# Patient Record
Sex: MALE | Race: WHITE | NOT HISPANIC OR LATINO | URBAN - METROPOLITAN AREA
[De-identification: names, ages, dates, MRNs, and addresses within clinical notes are randomized per-mention and may not be internally consistent; named-entity substitution may affect disease eponyms.]

---

## 2017-01-24 ENCOUNTER — IMPORTED ENCOUNTER (OUTPATIENT)
Dept: URBAN - METROPOLITAN AREA CLINIC 1 | Facility: CLINIC | Age: 72
End: 2017-01-24

## 2017-01-24 PROBLEM — H02.834: Noted: 2017-01-24

## 2017-01-24 PROBLEM — H02.831: Noted: 2017-01-24

## 2017-01-24 PROBLEM — H25.813: Noted: 2017-01-24

## 2017-01-24 PROBLEM — H35.033: Noted: 2017-01-24

## 2017-01-24 PROBLEM — H04.123: Noted: 2017-01-24

## 2017-01-24 PROBLEM — H16.143: Noted: 2017-01-24

## 2017-01-24 PROCEDURE — 92012 INTRM OPH EXAM EST PATIENT: CPT

## 2017-01-24 NOTE — PATIENT DISCUSSION
1.  Cataract OU: Progressive: Discussed options of Phaco vs. Observation. Pt wishes to hold on sx at this time. 2.  UZAIR w/ PEK OU- Continue ATs TID OU routinely. 3.  Dermatochalasis OU UL's  - Observe 4. Hypertensive Retinopathy OU- Stable continue HTN Control5. H/o DM w/o DR RENDON Return for an appointment in June/ July 30 glare (Schedule Phaco) with Dr. Rossana Lee.

## 2017-06-20 ENCOUNTER — IMPORTED ENCOUNTER (OUTPATIENT)
Dept: URBAN - METROPOLITAN AREA CLINIC 1 | Facility: CLINIC | Age: 72
End: 2017-06-20

## 2017-06-20 PROBLEM — H25.813: Noted: 2017-06-20

## 2017-06-20 PROBLEM — E11.9: Noted: 2017-06-20

## 2017-06-20 PROBLEM — H16.143: Noted: 2017-06-20

## 2017-06-20 PROBLEM — Z79.84: Noted: 2017-06-20

## 2017-06-20 PROBLEM — H04.123: Noted: 2017-06-20

## 2017-06-20 PROBLEM — H35.033: Noted: 2017-06-20

## 2017-06-20 PROCEDURE — 92014 COMPRE OPH EXAM EST PT 1/>: CPT

## 2017-06-20 PROCEDURE — 92015 DETERMINE REFRACTIVE STATE: CPT

## 2017-06-20 NOTE — PATIENT DISCUSSION
1.  DM Type II (Oral Medication) without sign of diabetic retinopathy and no blot heme on dilated retinal examination today OU No Macular Edema:  Discussed the pathophysiology of diabetes and its effect on the eye and risk of blindness. Stressed the importance of strong glucose control. Advised of importance of at least yearly dilated examinations but to contact us immediately for any problems or concerns. 2. Cataract OU:  Visually Significant secondary to glare discussed the risks benefits alternatives and limitations of cataract surgery. The patient stated a full understanding and a desire to proceed with the procedure. The patient will need to return for preop appointment with cataract measurements and to have any additional questions answered and start pre-operative eye drops as directed. Phaco PCL OS then OD Otherwise follow-up3. UZAIR w/ PEK OU-The use/continuation of artificial tears were recommended. 4.  GR I Hypertensive Retinopathy OU- Stable continue HTN ControlReturn for an appointment for [place] and [place] with Dr. Andrew Bernal.

## 2017-07-28 ENCOUNTER — IMPORTED ENCOUNTER (OUTPATIENT)
Dept: URBAN - METROPOLITAN AREA CLINIC 1 | Facility: CLINIC | Age: 72
End: 2017-07-28

## 2017-07-28 PROBLEM — H25.813: Noted: 2017-07-28

## 2017-07-28 PROCEDURE — 92136 OPHTHALMIC BIOMETRY: CPT

## 2017-07-28 NOTE — PATIENT DISCUSSION
1. Cataract OU:  Visually Significant secondary to glare discussed the risks benefits alternatives and limitations of cataract surgery. The patient stated a full understanding and a desire to proceed with the procedure. Pt understands they will need glasses post-op to achieve their best corrected vision. Pt wants what insurance covers. Phaco PCL OS then OD. Standard lens. Confirmed PO Meds were sent to Pharmacy.

## 2017-08-09 ENCOUNTER — IMPORTED ENCOUNTER (OUTPATIENT)
Dept: URBAN - METROPOLITAN AREA CLINIC 1 | Facility: CLINIC | Age: 72
End: 2017-08-09

## 2017-08-10 ENCOUNTER — IMPORTED ENCOUNTER (OUTPATIENT)
Dept: URBAN - METROPOLITAN AREA CLINIC 1 | Facility: CLINIC | Age: 72
End: 2017-08-10

## 2017-08-10 PROBLEM — Z96.1: Noted: 2017-08-10

## 2017-08-10 PROCEDURE — 99024 POSTOP FOLLOW-UP VISIT: CPT

## 2017-08-10 NOTE — PATIENT DISCUSSION
POD #1 CE/IOL OS (Standard) - doing well Continue Durezol BID OS Continue Ocuflox TID OS Continue Ilevro Qdaily OS

## 2017-08-15 ENCOUNTER — IMPORTED ENCOUNTER (OUTPATIENT)
Dept: URBAN - METROPOLITAN AREA CLINIC 1 | Facility: CLINIC | Age: 72
End: 2017-08-15

## 2017-08-15 PROBLEM — Z96.1: Noted: 2017-08-15

## 2017-08-15 PROBLEM — H25.811: Noted: 2017-08-15

## 2017-08-15 PROCEDURE — 92136 OPHTHALMIC BIOMETRY: CPT

## 2017-08-15 NOTE — PATIENT DISCUSSION
1.  Cataract OD: Visually Significant secondary to glare discussed the risks benefits alternatives and limitations of cataract surgery. The patient stated a full understanding and a desire to proceed with the procedure. The patient will need to start pre-operative eye drops as directed. Proceed w/ phaco PCL ODPt understands they will need glasses post-op to achieve their best corrected vision. 2.  POW#1  CE/IOL Standard OS doing well. Discontinue OcufloxContinue Lotemax/Durezol/Prednisolone BID until gone. Continue Prolensa/Ilevro/Acular QD until gone. 3. RTC as scheduled for sx OD w/ PMG.

## 2017-08-23 ENCOUNTER — IMPORTED ENCOUNTER (OUTPATIENT)
Dept: URBAN - METROPOLITAN AREA CLINIC 1 | Facility: CLINIC | Age: 72
End: 2017-08-23

## 2017-08-24 ENCOUNTER — IMPORTED ENCOUNTER (OUTPATIENT)
Dept: URBAN - METROPOLITAN AREA CLINIC 1 | Facility: CLINIC | Age: 72
End: 2017-08-24

## 2017-08-24 PROBLEM — Z96.1: Noted: 2017-08-24

## 2017-08-24 PROCEDURE — 99024 POSTOP FOLLOW-UP VISIT: CPT

## 2017-08-24 NOTE — PATIENT DISCUSSION
1. POD#1 CE/IOL Standard OD doing well. Continue all 3 gtts as prescribed and until gone. Ocuflox TIDDurezol BIDIlevro QDPost op Warnings Reiterated 2. POW#2  CE/IOL Standard OS doing well. Continue Durezol BID until gone. Continue Ilevro QD until gone. 3.   RTC as scheduled

## 2017-09-15 ENCOUNTER — IMPORTED ENCOUNTER (OUTPATIENT)
Dept: URBAN - METROPOLITAN AREA CLINIC 1 | Facility: CLINIC | Age: 72
End: 2017-09-15

## 2017-09-15 PROBLEM — Z96.1: Noted: 2017-09-15

## 2017-09-15 PROCEDURE — 99024 POSTOP FOLLOW-UP VISIT: CPT

## 2017-09-15 NOTE — PATIENT DISCUSSION
POW#3 Phaco/ PCL OU (Standard OU) doing well Finish PO meds per schedule MRX for glasses given Return for an appointment in May/ [place] 30 with Dr. Ronnie Marquez.

## 2018-06-14 ENCOUNTER — IMPORTED ENCOUNTER (OUTPATIENT)
Dept: URBAN - METROPOLITAN AREA CLINIC 1 | Facility: CLINIC | Age: 73
End: 2018-06-14

## 2018-06-14 PROBLEM — Z96.1: Noted: 2018-06-14

## 2018-06-14 PROBLEM — H16.143: Noted: 2018-06-14

## 2018-06-14 PROBLEM — H04.123: Noted: 2018-06-14

## 2018-06-14 PROBLEM — H02.834: Noted: 2018-06-14

## 2018-06-14 PROBLEM — H35.61: Noted: 2018-06-14

## 2018-06-14 PROBLEM — E11.9: Noted: 2018-06-14

## 2018-06-14 PROBLEM — Z79.84: Noted: 2018-06-14

## 2018-06-14 PROBLEM — H02.831: Noted: 2018-06-14

## 2018-06-14 PROBLEM — H35.033: Noted: 2018-06-14

## 2018-06-14 PROCEDURE — 92014 COMPRE OPH EXAM EST PT 1/>: CPT

## 2018-06-14 PROCEDURE — 92250 FUNDUS PHOTOGRAPHY W/I&R: CPT

## 2018-06-14 NOTE — PATIENT DISCUSSION
1.  DM Type II (Oral Meds) -- Without sign of diabetic retinopathy and no blot heme on dilated retinal examination today OU No Macular Edema:  Discussed the pathophysiology of diabetes and its effect on the eye and risk of blindness. Stressed the importance of strong glucose control. Advised of importance of at least yearly dilated examinations but to contact us immediately for any problems or concerns. 2.  GR I Hypertensive Retinopathy OU -- Continue HTN Control but with likely secondary retinal heme. Recommend f/u with PCP to gain control of BP3. Retinal Heme OD -- Likely secondary to HTN. Will obtain baseline MAC Photo today2. UZAIR w/ PEK OU-The use/continuation of artificial tears were recommended. 3.  Pseudophakia OU  4. Dermatochalasis OU UL's -- Follow with no intervention at this time. Patient defers the refraction at today's visit    Return for an appointment in 6 MO for a 10 / DFE OU with Dr. Nicola Cervantes.

## 2018-12-14 ENCOUNTER — IMPORTED ENCOUNTER (OUTPATIENT)
Dept: URBAN - METROPOLITAN AREA CLINIC 1 | Facility: CLINIC | Age: 73
End: 2018-12-14

## 2018-12-14 PROBLEM — H35.61: Noted: 2018-12-14

## 2018-12-14 PROBLEM — Z79.84: Noted: 2018-12-14

## 2018-12-14 PROBLEM — H16.143: Noted: 2018-12-14

## 2018-12-14 PROBLEM — H35.033: Noted: 2018-12-14

## 2018-12-14 PROBLEM — Z96.1: Noted: 2018-12-14

## 2018-12-14 PROBLEM — H02.834: Noted: 2018-12-14

## 2018-12-14 PROBLEM — H02.831: Noted: 2018-12-14

## 2018-12-14 PROBLEM — H04.123: Noted: 2018-12-14

## 2018-12-14 PROBLEM — E11.9: Noted: 2018-12-14

## 2018-12-14 PROCEDURE — 99213 OFFICE O/P EST LOW 20 MIN: CPT

## 2018-12-14 NOTE — PATIENT DISCUSSION
1. Retinal Heme OD- Resolved. Condition discussed w/ pt. 2.  DM Type II (Oral Meds) -- Without sign of diabetic retinopathy and no blot heme on dilated retinal examination today OU No Macular Edema: Stable. 3.  GR I Hypertensive Retinopathy OU- Continue HTN Control but with likely secondary retinal heme. Recommend f/u with PCP to gain control of BP4. UZAIR w/ PEK OU- Stable. The continuation of artificial tears were recommended. 5.  Pseudophakia OU  6. Dermatochalasis OU UL's -- Follow with no intervention at this time. 7. Return for an appointment for 27 in 1 year with Dr. Jayla Townsend.

## 2019-12-10 ENCOUNTER — IMPORTED ENCOUNTER (OUTPATIENT)
Dept: URBAN - METROPOLITAN AREA CLINIC 1 | Facility: CLINIC | Age: 74
End: 2019-12-10

## 2019-12-10 PROBLEM — H16.143: Noted: 2019-12-10

## 2019-12-10 PROBLEM — Z79.4: Noted: 2019-12-10

## 2019-12-10 PROBLEM — H02.831: Noted: 2019-12-10

## 2019-12-10 PROBLEM — H35.033: Noted: 2019-12-10

## 2019-12-10 PROBLEM — Z96.1: Noted: 2019-12-10

## 2019-12-10 PROBLEM — H04.123: Noted: 2019-12-10

## 2019-12-10 PROBLEM — H02.834: Noted: 2019-12-10

## 2019-12-10 PROBLEM — E11.9: Noted: 2019-12-10

## 2019-12-10 PROCEDURE — 92014 COMPRE OPH EXAM EST PT 1/>: CPT

## 2019-12-10 NOTE — PATIENT DISCUSSION
1.  DM Type II (Insulin) without sign of diabetic retinopathy and no blot heme on dilated retinal examination today OU No Macular Edema:  Discussed the pathophysiology of diabetes and its effect on the eye and risk of blindness. Stressed the importance of strong glucose control. Advised of importance of at least yearly dilated examinations but to contact us immediately for any problems or concerns. 2. UZAIR w/ PEK OU- Recommend ATs TID OU routinely 3. Pseudophakia OU - (Standard OU) 4. GR I Hypertensive Retinopathy OU- Stable continue HTN Control5. Dermatochalasis OU UL's  - Follow with no intervention at this time. Patient deferred Manifest Rx today. Return for an appointment in 1 year 27 with Dr. Edel Connolly.

## 2020-12-10 ENCOUNTER — IMPORTED ENCOUNTER (OUTPATIENT)
Dept: URBAN - METROPOLITAN AREA CLINIC 1 | Facility: CLINIC | Age: 75
End: 2020-12-10

## 2020-12-10 PROBLEM — H02.831: Noted: 2020-12-10

## 2020-12-10 PROBLEM — E11.9: Noted: 2020-12-10

## 2020-12-10 PROBLEM — Z79.4: Noted: 2020-12-10

## 2020-12-10 PROBLEM — Z96.1: Noted: 2020-12-10

## 2020-12-10 PROBLEM — H04.123: Noted: 2020-12-10

## 2020-12-10 PROBLEM — H16.143: Noted: 2020-12-10

## 2020-12-10 PROBLEM — H35.033: Noted: 2020-12-10

## 2020-12-10 PROBLEM — H02.834: Noted: 2020-12-10

## 2020-12-10 PROCEDURE — 92014 COMPRE OPH EXAM EST PT 1/>: CPT

## 2020-12-10 NOTE — PATIENT DISCUSSION
1.  DM Type II (Insulin) without sign of diabetic retinopathy and no blot heme on dilated retinal examination today OU No Macular Edema:  Discussed the pathophysiology of diabetes and its effect on the eye and risk of blindness. Stressed the importance of strong glucose control. Advised of importance of at least yearly dilated examinations but to contact us immediately for any problems or concerns. 2. UZAIR w/ PEK OU- Recommend ATs TID OU routinely 3. Pseudophakia OU - (Standard OU) 4. GR I Hypertensive Retinopathy OU- Stable continue HTN Control5. Dermatochalasis OU UL's  - Follow with no intervention at this time. Patient deferred Manifest Rx today. Return for an appointment in 1 year 27 with Dr. Angela Simeon.

## 2022-04-08 ASSESSMENT — VISUAL ACUITY
OS_SC: 20/20
OD_CC: 20/20
OD_GLARE: 20/400
OS_SC: 20/20
OS_CC: 20/20
OD_CC: 20/20-1
OS_CC: 20/30
OD_CC: 20/20
OS_GLARE: 20/60
OS_CC: 20/30-1
OD_CC: 20/20
OD_GLARE: 20/60
OS_CC: 20/20
OS_CC: 20/20-1
OS_GLARE: 20/100
OS_CC: 20/20
OD_SC: 20/30
OS_CC: 20/20
OD_CC: 20/20
OS_CC: 20/20
OD_CC: 20/20
OD_SC: 20/40

## 2022-04-08 ASSESSMENT — TONOMETRY
OD_IOP_MMHG: 17
OS_IOP_MMHG: 15
OS_IOP_MMHG: 13
OS_IOP_MMHG: 16
OS_IOP_MMHG: 13
OD_IOP_MMHG: 13
OD_IOP_MMHG: 15
OS_IOP_MMHG: 12
OS_IOP_MMHG: 15
OD_IOP_MMHG: 12
OS_IOP_MMHG: 12
OS_IOP_MMHG: 12
OS_IOP_MMHG: 14
OD_IOP_MMHG: 13
OS_IOP_MMHG: 12
OD_IOP_MMHG: 13